# Patient Record
Sex: FEMALE | Race: OTHER | Employment: UNEMPLOYED | URBAN - METROPOLITAN AREA
[De-identification: names, ages, dates, MRNs, and addresses within clinical notes are randomized per-mention and may not be internally consistent; named-entity substitution may affect disease eponyms.]

---
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
## 2022-03-01 ENCOUNTER — OFFICE VISIT (OUTPATIENT)
Dept: OBGYN CLINIC | Age: 22
End: 2022-03-01
Payer: COMMERCIAL

## 2022-03-01 VITALS
BODY MASS INDEX: 33.82 KG/M2 | HEIGHT: 65 IN | SYSTOLIC BLOOD PRESSURE: 116 MMHG | DIASTOLIC BLOOD PRESSURE: 78 MMHG | WEIGHT: 203 LBS

## 2022-03-01 DIAGNOSIS — Z01.419 WELL WOMAN EXAM: Primary | ICD-10-CM

## 2022-03-01 DIAGNOSIS — Z87.42 HX OF MENORRHAGIA: ICD-10-CM

## 2022-03-01 DIAGNOSIS — N94.6 DYSMENORRHEA IN ADOLESCENT: ICD-10-CM

## 2022-03-01 DIAGNOSIS — Z30.41 ENCOUNTER FOR BIRTH CONTROL PILLS MAINTENANCE: ICD-10-CM

## 2022-03-01 DIAGNOSIS — Z87.42 HISTORY OF MENORRHAGIA: ICD-10-CM

## 2022-03-01 PROCEDURE — 99395 PREV VISIT EST AGE 18-39: CPT | Performed by: CLINICAL NURSE SPECIALIST

## 2022-03-01 PROCEDURE — G8484 FLU IMMUNIZE NO ADMIN: HCPCS | Performed by: CLINICAL NURSE SPECIALIST

## 2022-03-01 RX ORDER — NORETHINDRONE ACETATE AND ETHINYL ESTRADIOL 1MG-20(21)
1 KIT ORAL DAILY
Qty: 1 PACKET | Refills: 11 | Status: SHIPPED | OUTPATIENT
Start: 2022-03-01

## 2025-07-18 ENCOUNTER — APPOINTMENT (OUTPATIENT)
Dept: GENERAL RADIOLOGY | Age: 25
End: 2025-07-18
Payer: COMMERCIAL

## 2025-07-18 ENCOUNTER — HOSPITAL ENCOUNTER (EMERGENCY)
Age: 25
Discharge: HOME | End: 2025-07-18
Attending: STUDENT IN AN ORGANIZED HEALTH CARE EDUCATION/TRAINING PROGRAM
Payer: COMMERCIAL

## 2025-07-18 VITALS
RESPIRATION RATE: 17 BRPM | HEART RATE: 74 BPM | OXYGEN SATURATION: 100 % | DIASTOLIC BLOOD PRESSURE: 82 MMHG | BODY MASS INDEX: 33.78 KG/M2 | SYSTOLIC BLOOD PRESSURE: 125 MMHG | WEIGHT: 203 LBS | TEMPERATURE: 97.9 F

## 2025-07-18 DIAGNOSIS — S93.402A SPRAIN OF LEFT ANKLE, UNSPECIFIED LIGAMENT, INITIAL ENCOUNTER: Primary | ICD-10-CM

## 2025-07-18 PROCEDURE — 73610 X-RAY EXAM OF ANKLE: CPT

## 2025-07-18 PROCEDURE — 73630 X-RAY EXAM OF FOOT: CPT

## 2025-07-18 PROCEDURE — 99283 EMERGENCY DEPT VISIT LOW MDM: CPT

## 2025-07-18 RX ORDER — HYDROCODONE BITARTRATE AND ACETAMINOPHEN 5; 325 MG/1; MG/1
1 TABLET ORAL EVERY 8 HOURS PRN
Qty: 9 TABLET | Refills: 0 | Status: SHIPPED | OUTPATIENT
Start: 2025-07-18 | End: 2025-07-21

## 2025-07-18 ASSESSMENT — ENCOUNTER SYMPTOMS
SHORTNESS OF BREATH: 0
COLOR CHANGE: 0

## 2025-07-18 ASSESSMENT — PAIN - FUNCTIONAL ASSESSMENT: PAIN_FUNCTIONAL_ASSESSMENT: 0-10

## 2025-07-18 ASSESSMENT — PAIN SCALES - GENERAL: PAINLEVEL_OUTOF10: 7

## 2025-07-18 NOTE — ED PROVIDER NOTES
[facility] EMERGENCY DEPARTMENT  eMERGENCY dEPARTMENT eNCOUnter      Pt Name: Tom Rutherford  MRN: [id number]  Birthdate 2000  Date of evaluation: 7/18/2025  Provider: AAYUSH Wylie CNP    CHIEF COMPLAINT       Chief Complaint   Patient presents with    Ankle Pain     Pt twisted left ankle twice in 1 week. Ankle is swollen and tender.          HISTORY OF PRESENT ILLNESS  (Location/Symptom, Timing/Onset, Context/Setting, Quality, Duration, Modifying Factors, Severity.)   Tom Rutherford is a 25 y.o. female who presents to the emergency department. C/o pain, swelling to her left ankle. States she [place] the [place] [employer] [place]. She twisted it again yesterday when stepping on uneven ground. States her pain is now worse. Denies fever, chills, N/T. She has been elevating the extremity and applying ice. She took motrin earlier today. Rates her pain 7/10.  Denies chance of pregnancy.     Nursing Notes were reviewed.    ALLERGIES     Patient has no known allergies.    CURRENT MEDICATIONS       Previous Medications    NORETHINDRONE-ETHINYL ESTRADIOL (LOESTRIN FE 1/20) 1-20 MG-MCG PER TABLET    Take 1 tablet by mouth daily       PAST MEDICAL HISTORY         Diagnosis Date    Migraine        SURGICAL HISTORY     History reviewed. No pertinent surgical history.      FAMILY HISTORY           Problem Relation Age of Onset    Sickle Cell Trait Father     Sickle Cell Trait Brother     Asthma Brother     Diabetes Brother     Sickle Cell Trait Paternal Grandmother     Diabetes Mother     Diabetes Sister     Hypertension Sister      Family Status   Relation Name Status    Father  (Not Specified)    Brother  (Not Specified)    PGM  (Not Specified)    Mother  (Not Specified)    Sister  (Not Specified)   No partnership data on file        SOCIAL HISTORY      reports that she is a non-smoker but has been exposed to tobacco smoke. She has never used smokeless tobacco. She reports

## 2025-07-19 NOTE — ED PROVIDER NOTES
eMERGENCY dEPARTMENT eNCOUnter   Independent Attestation     Pt Name: Tom Rutherford  MRN: [id number]  Birthdate 2000  Date of evaluation: 7/19/25     Tom Rutherford is a 25 y.o. female with CC: Ankle Pain (Pt twisted left ankle twice in 1 week. Ankle is swollen and tender. )        This visit was performed by both a physician and an APC. I performed all aspects of the MDM as documented.      FINAL IMPRESSION      1. Sprain of left ankle, unspecified ligament, initial encounter          DISPOSITION/PLAN   DISPOSITION Decision To Discharge 07/18/2025 07:14:33 PM   DISPOSITION CONDITION Stable           El Ingram DO  Attending Emergency Physician         El Ingram DO  07/19/25 112